# Patient Record
Sex: MALE | Race: BLACK OR AFRICAN AMERICAN | Employment: UNEMPLOYED | URBAN - NONMETROPOLITAN AREA
[De-identification: names, ages, dates, MRNs, and addresses within clinical notes are randomized per-mention and may not be internally consistent; named-entity substitution may affect disease eponyms.]

---
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
## 2021-02-07 ENCOUNTER — HOSPITAL ENCOUNTER (EMERGENCY)
Age: 21
Discharge: HOME | End: 2021-02-07
Attending: EMERGENCY MEDICINE
Payer: MEDICAID

## 2021-02-07 ENCOUNTER — APPOINTMENT (OUTPATIENT)
Dept: GENERAL RADIOLOGY | Age: 21
End: 2021-02-07
Attending: EMERGENCY MEDICINE
Payer: MEDICAID

## 2021-02-07 VITALS
RESPIRATION RATE: 17 BRPM | BODY MASS INDEX: 29.4 KG/M2 | DIASTOLIC BLOOD PRESSURE: 78 MMHG | HEART RATE: 66 BPM | WEIGHT: 210 LBS | OXYGEN SATURATION: 99 % | HEIGHT: 71 IN | SYSTOLIC BLOOD PRESSURE: 124 MMHG | TEMPERATURE: 98.4 F

## 2021-02-07 DIAGNOSIS — S16.1XXA STRAIN OF NECK MUSCLE, INITIAL ENCOUNTER: ICD-10-CM
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
## 2021-02-07 PROCEDURE — 74011250637 HC RX REV CODE- 250/637: Performed by: EMERGENCY MEDICINE

## 2021-02-07 PROCEDURE — 72050 X-RAY EXAM NECK SPINE 4/5VWS: CPT

## 2021-02-07 PROCEDURE — 99283 EMERGENCY DEPT VISIT LOW MDM: CPT

## 2021-02-07 RX ORDER — ACETAMINOPHEN 500 MG
500 TABLET ORAL
Status: COMPLETED | OUTPATIENT
Start: 2021-02-07 | End: 2021-02-07

## 2021-02-07 RX ADMIN — ACETAMINOPHEN 500 MG: 500 TABLET ORAL at 17:15
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
## 2021-03-02 ENCOUNTER — HOSPITAL ENCOUNTER (OUTPATIENT)
Dept: PHYSICAL THERAPY | Age: 21
Discharge: HOME | End: 2021-03-02
Payer: MEDICAID

## 2021-03-02 PROCEDURE — 97110 THERAPEUTIC EXERCISES: CPT

## 2021-03-02 PROCEDURE — 97161 PT EVAL LOW COMPLEX 20 MIN: CPT
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
## 2021-03-08 ENCOUNTER — HOSPITAL ENCOUNTER (OUTPATIENT)
Dept: PHYSICAL THERAPY | Age: 21
Discharge: HOME | End: 2021-03-08
Payer: MEDICAID

## 2021-03-08 PROCEDURE — 97110 THERAPEUTIC EXERCISES: CPT

## 2021-03-08 PROCEDURE — 97014 ELECTRIC STIMULATION THERAPY: CPT

## 2021-03-08 NOTE — PROGRESS NOTES
PT DAILY TREATMENT NOTE 2-15    Patient Name: Chip Schilling  Date:3/8/2021  : 2000  [x]  Patient  Verified  Payor: Andrew Ayala / Plan: [place] [employer] / Product Type: Managed Care Medicaid /    In time:1500  Out time:1605  Total Treatment Time (min): 65  Visit #:  2    Treatment Area: Neck pain [M54.2]  Lumbar spine strain [S39.012A]    SUBJECTIVE  Pain Level (0-10 scale): 5/10  Any medication changes, allergies to medications, adverse drug reactions, diagnosis change, or new procedure performed?: [x] No    [] Yes (see summary sheet for update)  Subjective functional status/changes:   [] No changes reported  \"I'm feeling a little better than I did the other day but I still have pain. \"    OBJECTIVE      Modality rationale: decrease pain and increase tissue extensibility to improve the patients ability to perform functional mobility without pain   Min Type Additional Details      15 [x] Estim: []Att   []Unatt    []TENS instruct                  []IFC  []Premod   []NMES                    []Other:  []w/US      [x]w/ heat  []w/ ice  Position:seated  Location: cervical and lumbar       []  Traction: [] Cervical       []Lumbar                       [] Prone          []Supine                       []Intermittent   []Continuous Lbs:  [] before manual  [] after manual  [] w/ heat  [] Simultaneously performed with w/ Estim    []  Ultrasound: []Continuous   [] Pulsed                       at: []1MHz   []3MHz Location:  W/cm2:    [] Paraffin         Location:   []w/heat    []  Ice     []  Heat  []  Ice massage Position:  Location:    []  Laser  []  Other: Position:  Location:      []  Vasopneumatic Device Pressure:       [] lo [] med [] hi   [] w/ ice      Temperature:   [] Simultaneously performed with w/ Estim     [x] Skin assessment post-treatment:  [x]intact [x]redness- no adverse reaction    []redness - adverse reaction:       50 min Therapeutic Exercise:  [] See flow sheet :   Rationale: increase ROM, increase strength and increase core stability to improve the patients ability to move and change body positions without discomfort    With   [x] TE   [] TA   [] neuro   [] other: Patient Education: [x] Review HEP    [] Progressed/Changed HEP based on:   [] positioning   [] body mechanics   [] transfers   [] heat/ice application    [] other:      Other Objective/Functional Measures: Pt tolerated treatment well but needed lots of cueing for technique, even with already issued HEP which suggests the need for greater reenforcement     Pain Level (0-10 scale) post treatment: 0/10    ASSESSMENT/Changes in Function:   Pt is progressing well and had no pain by then end of today's treatment session. Continue POC progressing as able. Patient will continue to benefit from skilled PT services to modify and progress therapeutic interventions, address functional mobility deficits, address ROM deficits, address strength deficits, analyze and address soft tissue restrictions and analyze and modify body mechanics/ergonomics to attain remaining goals. [x]  See Plan of Care  []  See progress note/recertification  []  See Discharge Summary         Progress towards goals / Updated goals: To be accomplished in 4 treatments:  1. Pt will be independent and compliant with HEP to facilitate functional mobility. Progressing  2. Pt will be able to demonstrate proper lifting technique to facilitate performance of ADLs. Progressing  3. Pt will be able to ambulate 0.5 miles with pain no greater than a 4/10 to facilitate community ambulation. Progressing     Long Term Goals: To be accomplished in 8 treatments:  1. Pt will be able to ambulate 1 mile with pain no greater than a 2/10 to facilitate community ambulation. Progressing  2. Pt will be able to lift 25# from floor to table with proper lifting technique with pain no greater than a 2/10 to facilitate performance of ADLs. Progressing  3.  Pt will be able to return to playing basketball with pain no greater than a 2/10 to facilitate return to PLOF.   Progressing     PLAN  [x]  Upgrade activities as tolerated     [x]  Continue plan of care  []  Update interventions per flow sheet       []  Discharge due to:_  []  Other:_      Jacinta Oneal, PT, DPT 3/8/2021

## 2021-03-09 ENCOUNTER — HOSPITAL ENCOUNTER (OUTPATIENT)
Dept: PHYSICAL THERAPY | Age: 21
End: 2021-03-09
Payer: MEDICAID

## 2021-03-11 ENCOUNTER — HOSPITAL ENCOUNTER (OUTPATIENT)
Dept: PHYSICAL THERAPY | Age: 21
Discharge: HOME | End: 2021-03-11
Payer: MEDICAID

## 2021-03-11 PROCEDURE — 97110 THERAPEUTIC EXERCISES: CPT

## 2021-03-11 PROCEDURE — 97014 ELECTRIC STIMULATION THERAPY: CPT

## 2021-03-11 NOTE — PROGRESS NOTES
PT DAILY TREATMENT NOTE 2-15    Patient Name: Kate Dandy  Date:3/11/2021  : 2000  [x]  Patient  Verified  Payor: Julia Cole / Plan: [email or web address] / Product Type: Managed Care Medicaid /    In time:9:02  Out time:1005  Total Treatment Time (min): 63  Visit #:  3    Treatment Area: Neck pain [M54.2]  Lumbar spine strain [S39.012A]    SUBJECTIVE  Pain Level (0-10 scale): 5/10  Any medication changes, allergies to medications, adverse drug reactions, diagnosis change, or new procedure performed?: [x] No    [] Yes (see summary sheet for update)  Subjective functional status/changes:   [] No changes reported  \"My neck was bothering me after last session. \"    OBJECTIVE      Modality rationale: decrease pain and increase tissue extensibility to improve the patients ability to perform ADLs without increased pain.    Min Type Additional Details      10 [x] Estim: []Att   [x]Unatt    []TENS instruct                  [x]IFC  []Premod   []NMES                    []Other:  []w/US      [x]w/ heat  []w/ ice  Position: Seated  Location: Low back / neck       []  Traction: [] Cervical       []Lumbar                       [] Prone          []Supine                       []Intermittent   []Continuous Lbs:  [] before manual  [] after manual  [] w/ heat  [] Simultaneously performed with w/ Estim    []  Ultrasound: []Continuous   [] Pulsed                       at: []1MHz   []3MHz Location:  W/cm2:    [] Paraffin         Location:   []w/heat   10 []  Ice     [x]  Heat  []  Ice massage Position: seated  Location: low back/neck    []  Laser  []  Other: Position:  Location:      []  Vasopneumatic Device Pressure:       [] lo [] med [] hi   [] w/ ice      Temperature:   [] Simultaneously performed with w/ Estim     [x] Skin assessment post-treatment:  [x]intact []redness- no adverse reaction    []redness - adverse reaction:       45 min Therapeutic Exercise:  [x] See flow sheet :   Rationale: increase ROM and increase strength to improve the patients ability to allow pt to participate in recreational activities without increased pain levels. With   [x] TE   [] TA   [] neuro   [] other: Patient Education: [x] Review HEP    [] Progressed/Changed HEP based on:   [] positioning   [] body mechanics   [] transfers   [] heat/ice application    [] other:      Other Objective/Functional Measures: Added in posterior shoulder rolls to further improve posture and mobility. Pain Level (0-10 scale) post treatment: 0/10    ASSESSMENT/Changes in Function:   Pt tolerated treatment well today. Pt able to perform exercises without increase in symptoms. Pt required verbal cues to maintain upright posture while performing upper trap and levator stretches. Pt noted decreased tightness in neck following stretches. Patient will continue to benefit from skilled PT services to modify and progress therapeutic interventions, address functional mobility deficits, analyze and address soft tissue restrictions, analyze and cue movement patterns and assess and modify postural abnormalities to attain remaining goals. [x]  See Plan of Care  []  See progress note/recertification  []  See Discharge Summary         Progress towards goals / Updated goals: To be accomplished in 4 treatments:  1. Pt will be independent and compliant with HEP to facilitate functional mobility. Progressing  2. Pt will be able to demonstrate proper lifting technique to facilitate performance of ADLs. Progressing  3. Pt will be able to ambulate 0.5 miles with pain no greater than a 4/10 to facilitate community ambulation. Progressing     Long Term Goals: To be accomplished in 8 treatments:  1. Pt will be able to ambulate 1 mile with pain no greater than a 2/10 to facilitate community ambulation.   Progressing  2. Pt will be able to lift 25# from floor to table with proper lifting technique with pain no greater than a 2/10 to facilitate performance of ADLs. Progressing  3. Pt will be able to return to playing basketball with pain no greater than a 2/10 to facilitate return to PLOF.   Progressing    PLAN  [x]  Upgrade activities as tolerated     [x]  Continue plan of care  []  Update interventions per flow sheet       []  Discharge due to:_  []  Other:_      Luis Fernando Jamil, PT, DPT 3/11/2021

## 2021-03-18 ENCOUNTER — HOSPITAL ENCOUNTER (OUTPATIENT)
Dept: PHYSICAL THERAPY | Age: 21
Discharge: HOME | End: 2021-03-18
Payer: MEDICAID

## 2021-03-18 PROCEDURE — 97014 ELECTRIC STIMULATION THERAPY: CPT

## 2021-03-18 PROCEDURE — 97110 THERAPEUTIC EXERCISES: CPT

## 2021-03-18 NOTE — PROGRESS NOTES
PT DAILY TREATMENT NOTE 2-15    Patient Name: Lewis Gay  Date:3/18/2021  : 2000  [x]  Patient  Verified  Payor: [employer] / Plan: [employer] / Product Type: Managed Care Medicaid /    In time: 8:04  Out time:8:57  Total Treatment Time (min): 53  Visit #: 4    Treatment Area: Neck pain [M54.2]  Lumbar spine strain [S39.012A]    SUBJECTIVE  Pain Level (0-10 scale): 0/10  Any medication changes, allergies to medications, adverse drug reactions, diagnosis change, or new procedure performed?: [x] No    [] Yes (see summary sheet for update)  Subjective functional status/changes:   [] No changes reported  Pt reports that his back bothers him on occasion and his neck is \"all better. \"     OBJECTIVE  Modality rationale: decrease pain and increase tissue extensibility to improve the patients ability to perform ADLs with reduced pain.    Min Type Additional Details      10 [x] Estim: []Att   [x]Unatt    []TENS instruct                  [x]IFC  []Premod   []NMES                    []Other:  []w/US      [x]w/ heat  []w/ ice  Position: Seated  Location: Over R lumbar region       []  Traction: [] Cervical       []Lumbar                       [] Prone          []Supine                       []Intermittent   []Continuous Lbs:  [] before manual  [] after manual  [] w/ heat  [] Simultaneously performed with w/ Estim    []  Ultrasound: []Continuous   [] Pulsed                       at: []1MHz   []3MHz Location:  W/cm2:    [] Paraffin         Location:   []w/heat   10 []  Ice     [x]  Heat  []  Ice massage Position: Seated  Location: Over R lumbar region    []  Laser  []  Other: Position:  Location:      []  Vasopneumatic Device Pressure:       [] lo [] med [] hi   [] w/ ice      Temperature:   [] Simultaneously performed with w/ Estim     [x] Skin assessment post-treatment:  [x]intact [x]redness- no adverse reaction    []redness - adverse reaction:     43 min Therapeutic Exercise:  [x] See flow sheet : Rationale: increase ROM and increase strength to improve the patients ability to perform ADLs with reduced pain. With   [x] TE   [] TA   [] neuro   [] other: Patient Education: [x] Review HEP    [] Progressed/Changed HEP based on:   [] positioning   [] body mechanics   [] transfers   [] heat/ice application    [] other:      Other Objective/Functional Measures: Increased repetitions with deadbugs and LTR. Pain Level (0-10 scale) post treatment: 0/10    ASSESSMENT/Changes in Function:   Pt tolerated therapy session well. Pt is steadily progressing with core stability and glute strengthening, but continues to be limited by occasional lumbar pain. Pt notes resolution of cervical pain and presence of lumbar pain that \"comes and goes. \" Emphasis of treatment session was on stretching, core stability, and glute strengthening. Pt requires verbal cueing for proper performance of QL stretch at wall for proper positioning. Continue to progress as able. Patient will continue to benefit from skilled PT services to modify and progress therapeutic interventions, address functional mobility deficits, address ROM deficits, address strength deficits, analyze and address soft tissue restrictions, analyze and cue movement patterns, analyze and modify body mechanics/ergonomics and assess and modify postural abnormalities to attain remaining goals. [x]  See Plan of Care  []  See progress note/recertification  []  See Discharge Summary         Progress towards goals / Updated goals: To be accomplished in 4 treatments:  1. Pt will be independent and compliant with HEP to facilitate functional mobility. -Met  2. Pt will be able to demonstrate proper lifting technique to facilitate performance of ADLs.   Progressing  3. Pt will be able to ambulate 0.5 miles with pain no greater than a 4/10 to facilitate community ambulation.  Progressing     Long Term Goals: To be accomplished in 8 treatments:  1.  Pt will be able to ambulate 1 mile with pain no greater than a 2/10 to facilitate community ambulation.   Progressing  2. Pt will be able to lift 25# from floor to table with proper lifting technique with pain no greater than a 2/10 to facilitate performance of ADLs.   Progressing  3. Pt will be able to return to playing basketball with pain no greater than a 2/10 to facilitate return to PLOF.  Progressing    PLAN  [x]  Upgrade activities as tolerated     [x]  Continue plan of care  []  Update interventions per flow sheet       []  Discharge due to:_  []  Other:_      Alexis Suggs, PT, DPT 3/18/2021

## 2021-03-23 ENCOUNTER — HOSPITAL ENCOUNTER (OUTPATIENT)
Dept: PHYSICAL THERAPY | Age: 21
Discharge: HOME | End: 2021-03-23
Payer: MEDICAID

## 2021-03-23 PROCEDURE — 97110 THERAPEUTIC EXERCISES: CPT

## 2021-03-23 PROCEDURE — 97014 ELECTRIC STIMULATION THERAPY: CPT

## 2021-03-23 NOTE — PROGRESS NOTES
PT DAILY TREATMENT NOTE 2-15    Patient Name: Jun All  Date:3/23/2021  : 2000  [x]  Patient  Verified  Payor: Terence Roland / Plan: [email or web address] / Product Type: Managed Care Medicaid /    In time:10:05 AM  Out time:11:00 AM  Total Treatment Time (min):55  Visit #:  5    Treatment Area: Neck pain [M54.2]  Lumbar spine strain [S39.012A]    SUBJECTIVE  Pain Level (0-10 scale): 0  Any medication changes, allergies to medications, adverse drug reactions, diagnosis change, or new procedure performed?: [x] No    [] Yes (see summary sheet for update)  Subjective functional status/changes:   [] No changes reported    Patient stated that he missed his last appointment because he had to go to work early at [email or web address]. Pain increases at work when he has to stand  as a  and reach and put groceries in the bags. OBJECTIVE    Modality rationale: decrease pain and increase muscle contraction/control to improve the patients ability to ambulate 1 mile with pain no greater than a 2/10 to facilitate community ambulation.    Min Type Additional Details      15 [] Estim: []Att   [x]Unatt    []TENS instruct                  []IFC  [x]Premod   []NMES                     []Other:  []w/US   []w/ice   [x]w/heat  Position: left side of Low back  Location: sitting position       []  Traction: [] Cervical       []Lumbar                       [] Prone          []Supine                       []Intermittent   []Continuous Lbs:  [] before manual  [] after manual  []w/heat    []  Ultrasound: []Continuous   [] Pulsed                       at: []1MHz   []3MHz Location:  W/cm2:    [] Paraffin         Location:   []w/heat    []  Ice     []  Heat  []  Ice massage Position:  Location:    []  Laser  []  Other: Position:  Location:      []  Vasopneumatic Device Pressure:       [] lo [] med [] hi   Temperature:      [x] Skin assessment post-treatment:  [x]intact []redness- no adverse reaction    []redness - adverse reaction: 40 min Therapeutic Exercise:  [x] See flow sheet :   Rationale: increase ROM and increase strength to improve the patients ability to playing basketball with pain no greater than a 2/10 to facilitate return to PLOF. With   [] TE   [] TA   [] Neuro   [] SC   [] other: Patient Education: [x] Review HEP    [] Progressed/Changed HEP based on:   [] positioning   [] body mechanics   [] transfers   [] heat/ice application    [] other:        Pain Level (0-10 scale) post treatment: 0    ASSESSMENT/Changes in Function:   Patient is able to perform all exercises as instructed. Continue to work on lumbar stabilization for improving long time standing positions particular at work. Proper liftng and carrying technique will also help to improve lumbar from overexertion. Patient will continue to benefit from skilled PT services to address functional mobility deficits, address ROM deficits, address strength deficits and analyze and cue movement patterns to attain remaining goals. [x]  See Plan of Care  []  See progress note/recertification  []  See Discharge Summary         Progress towards goals / Updated goals:  :  To be accomplished in 4 treatments:  1. Pt will be independent and compliant with HEP to facilitate functional mobility. -Met  2. Pt will be able to demonstrate proper lifting technique to facilitate performance of ADLs.   Progressing  3. Pt will be able to ambulate 0.5 miles with pain no greater than a 4/10 to facilitate community ambulation.  Progressing     Long Term Goals: To be accomplished in 8 treatments:  1. Pt will be able to ambulate 1 mile with pain no greater than a 2/10 to facilitate community ambulation.   Progressing  2. Pt will be able to lift 25# from floor to table with proper lifting technique with pain no greater than a 2/10 to facilitate performance of ADLs.   Progressing  3. Pt will be able to return to playing basketball with pain no greater than a 2/10 to facilitate return to OF.  Progressing    PLAN  []  Upgrade activities as tolerated     [x]  Continue plan of care  []  Update interventions per flow sheet       []  Discharge due to:_  []  Other:_      Anmol Carrion, PT 3/23/2021

## 2021-03-29 ENCOUNTER — HOSPITAL ENCOUNTER (OUTPATIENT)
Dept: PHYSICAL THERAPY | Age: 21
Discharge: HOME | End: 2021-03-29
Payer: MEDICAID

## 2021-03-29 PROCEDURE — 97110 THERAPEUTIC EXERCISES: CPT

## 2021-03-29 PROCEDURE — 97014 ELECTRIC STIMULATION THERAPY: CPT

## 2021-03-29 NOTE — PROGRESS NOTES
PT DAILY TREATMENT NOTE 2-15    Patient Name: Krista Toribio  Date:3/29/2021  : 2000  [x]  Patient  Verified  Payor: Reji Dyson / Plan: [id number] / Product Type: Managed Care Medicaid /    In time:10:02  Out time:10:56  Total Treatment Time (min): 47  Visit #: 6    Treatment Area: Neck pain [M54.2]  Lumbar spine strain [S39.012A]    SUBJECTIVE  Pain Level (0-10 scale): 0/10  Any medication changes, allergies to medications, adverse drug reactions, diagnosis change, or new procedure performed?: [x] No    [] Yes (see summary sheet for update)  Subjective functional status/changes:   [] No changes reported  Pt reports that he thinks his back is getting better, but he is still noticing some tightness and pain at work. OBJECTIVE    Modality rationale: decrease pain and increase tissue extensibility to improve the patients ability to perform work-related duties with reduced pain.    Min Type Additional Details      10 [x] Estim: []Att   [x]Unatt    []TENS instruct                  [x]IFC  []Premod   []NMES                    []Other:  []w/US      [x]w/ heat  []w/ ice  Position: Seated  Location: Over R lumbar region       []  Traction: [] Cervical       []Lumbar                       [] Prone          []Supine                       []Intermittent   []Continuous Lbs:  [] before manual  [] after manual  [] w/ heat  [] Simultaneously performed with w/ Estim    []  Ultrasound: []Continuous   [] Pulsed                       at: []1MHz   []3MHz Location:  W/cm2:    [] Paraffin         Location:   []w/heat   10 []  Ice     [x]  Heat  []  Ice massage Position: Seated  Location: Over lumbar region    []  Laser  []  Other: Position:  Location:      []  Vasopneumatic Device Pressure:       [] lo [] med [] hi   [] w/ ice      Temperature:   [] Simultaneously performed with w/ Estim     [x] Skin assessment post-treatment:  [x]intact [x]redness- no adverse reaction    []redness - adverse reaction:       44 min Therapeutic Exercise:  [x] See flow sheet :   Rationale: increase ROM and increase strength to improve the patients ability to perform work-related duties with reduced pain. With   [x] TE   [] TA   [] neuro   [] other: Patient Education: [x] Review HEP    [] Progressed/Changed HEP based on:   [] positioning   [] body mechanics   [] transfers   [] heat/ice application    [x] other: Pt educated on proper lifting technique for maintaining good positioning with lifting at work. Other Objective/Functional Measures: Added crate lifts, farmer's carry, and planks to facilitate further core stability training. Pain Level (0-10 scale) post treatment: 0/10    ASSESSMENT/Changes in Function:   Pt tolerated therapy session well. Pt is steadily progressing with reduction in myofasical pain and muscular tightness as well as enhancement of core stability, but continues to be limited with more advance core stability with functional tasks. Pt requires verbal cueing for proper performance of glute bridges and maintaining tension against theraband to promote hip abduction strengthening along with hip extension. Pt educated on proper lifting technique for maintaining proper lumbar alignment with lifting at work. Continue to progress as able. Patient will continue to benefit from skilled PT services to modify and progress therapeutic interventions, address functional mobility deficits, address ROM deficits, address strength deficits, analyze and address soft tissue restrictions, analyze and cue movement patterns and analyze and modify body mechanics/ergonomics to attain remaining goals. [x]  See Plan of Care  []  See progress note/recertification  []  See Discharge Summary         Progress towards goals / Updated goals:  Short Term Goals: To be accomplished in 4 treatments:  1. Pt will be independent and compliant with HEP to facilitate functional mobility. -Met  2.  Pt will be able to demonstrate proper lifting technique to facilitate performance of ADLs.  - Met  3. Pt will be able to ambulate 0.5 miles with pain no greater than a 4/10 to facilitate community ambulation.  Progressing     Long Term Goals: To be accomplished in 8 treatments:  1. Pt will be able to ambulate 1 mile with pain no greater than a 2/10 to facilitate community ambulation.   Progressing  2. Pt will be able to lift 25# from floor to table with proper lifting technique with pain no greater than a 2/10 to facilitate performance of ADLs.   Progressing  3. Pt will be able to return to playing basketball with pain no greater than a 2/10 to facilitate return to PLOF.  Progressing    PLAN  [x]  Upgrade activities as tolerated     [x]  Continue plan of care  []  Update interventions per flow sheet       []  Discharge due to:_  []  Other:_      Amarilis Chi, PT, DPT 3/29/2021

## 2021-03-30 ENCOUNTER — APPOINTMENT (OUTPATIENT)
Dept: PHYSICAL THERAPY | Age: 21
End: 2021-03-30
Payer: MEDICAID

## 2021-04-08 ENCOUNTER — HOSPITAL ENCOUNTER (OUTPATIENT)
Dept: PHYSICAL THERAPY | Age: 21
Discharge: HOME | End: 2021-04-08
Payer: MEDICAID

## 2021-04-08 PROCEDURE — 97110 THERAPEUTIC EXERCISES: CPT

## 2021-04-08 PROCEDURE — 97530 THERAPEUTIC ACTIVITIES: CPT

## 2021-04-08 NOTE — PROGRESS NOTES
PT DAILY TREATMENT NOTE 2-15    Patient Name: Alberto Grayson  Date:2021  : 2000  [x]  Patient  Verified  Payor: Taylerpa Sofia / Plan: [id number] / Product Type: Managed Care Medicaid /    In time:805 am  Out time:850 am  Total Treatment Time (min): 45  Visit #:  7    Treatment Area: Cervicalgia [M54.2]  Strain of muscle, fascia and tendon of lower back, initial encounter [S39.012A]    SUBJECTIVE  Pain Level (0-10 scale): 0/10  Any medication changes, allergies to medications, adverse drug reactions, diagnosis change, or new procedure performed?: [x] No    [] Yes (see summary sheet for update)  Subjective functional status/changes:   [] No changes reported  \"Pt reports no pain only some soreness after playing basketball for 4 hours the other day. \"    OBJECTIVE      30 min Therapeutic Exercise:  [x] See flow sheet :   Rationale: increase ROM, increase strength and improve coordination to improve the patients ability to increase core strength for functional activity and work duties. 15 min Therapeutic Activity:  []  See flow sheet :   Rationale: review and check of  increase ROM and increase strength  to improve the patients ability to perform community level task and return to work.         With   [] TE   [] TA   [] neuro   [] other: Patient Education: [x] Review HEP    [] Progressed/Changed HEP based on:   [] positioning   [] body mechanics   [] transfers   [] heat/ice application    [x] other: updated for continued use after therapy services     Cervical AROM:                       Flexion                                              30                                              Extension                                         70                                                                                          R                      L  Side Bending                           60                   60                      Rotation                                   60 60                     UPPER QUARTER                             MUSCLE STRENGTH  KEY                                                                                R                      L  0 - No Contraction                   C1, C2 Neck Flex        5/5 5/5  1 - Trace                                  C3 Side Flex               5/5 5/5  2 - Poor                                   C4 Sh Elev                  5/5 5/5  3 - Fair                                     C5 Deltoid/Biceps       5/5 5/5  4 - Good                                    5 - Normal                                                                                                           Neurological: Reflexes / Sensations: LT sensation intact in B UEs.   Special Tests: Cervical Distraction: Negative                       Cervical Compression: Negative                                                                                                    Lumbar AROM:                                               Flexion                                              100%                                            Extension                                         100%                                                                                            R                                   L  Side Bending                           100%                           100%                                         Rotation                                   100% p!                       100%                    Manual Muscle Testing  Hip Flexion                   5/5 5/5  Hip Abduction              5/5 5/5  Hip Adduction              5/5 5/5  Knee Extension           5/5 5/5  Knee Flexion               5/5 5/5  Ankle PF                     5/5 5/5  Ankle DF                     5/5 5/5     Flexibility: Bilateral HS and gastroc tightness                                                Special Tests: Slump: Negative        Pain Level (0-10 scale) post treatment: 0/10    ASSESSMENT/Changes in Function:   Patient tolerated treatment session with review of core strengthening ex. Check of goals and mm strength and ROM measure. Discussion with DPT on currently levels and plan for discharge at this time. Patient will continue to benefit from skilled PT services to no continued need going forward. goals met. []  See Plan of Care  []  See progress note/recertification  [x]  See Discharge Summary         Progress towards goals / Updated goals:  Short Term Goals: To be accomplished in 4 treatments:  1. Pt will be independent and compliant with HEP to facilitate functional mobility. met  2. Pt will be able to demonstrate proper lifting technique to facilitate performance of ADLs. met  3. Pt will be able to ambulate 0.5 miles with pain no greater than a 4/10 to facilitate community ambulation. met     Long Term Goals: To be accomplished in 8 treatments:  1. Pt will be able to ambulate 1 mile with pain no greater than a 2/10 to facilitate community ambulation. met  2. Pt will be able to lift 25# from floor to table with proper lifting technique with pain no greater than a 2/10 to facilitate performance of ADLs. met  3. Pt will be able to return to playing basketball with pain no greater than a 2/10 to facilitate return to PLOF. Partial met (post 4 hours)       PLAN  []  Upgrade activities as tolerated     []  Continue plan of care  []  Update interventions per flow sheet       [x]  Discharge due to: goals met and return to work next week   [x]  Other:_see DPT note      Teania L. Jaycee Bumpers, Alfred Sample 4/8/2021

## 2021-04-08 NOTE — PROGRESS NOTES
[address], [address]  Ph: [phone]    Fax: [phone]    Discharge Summary  2-15    Patient name: Vincenzo Shelley  : 2000  Provider#: [id number]  Referral source: Natha Goodell, PA-C      Medical/Treatment Diagnosis: Cervicalgia [M54.2]  Strain of muscle, fascia and tendon of lower back, initial encounter [U12.857E]     Prior Hospitalization: see medical history     Comorbidities: See Plan of Care  Prior Level of Function:See Plan of Care  Medications: Verified on Patient Summary List    Start of Care: 3/2/2021      Onset Date: 2021   Visits from Start of Care: 7 Missed Visits: 5  Reporting Period : 3/2/2021 to 2021    ASSESSMENT/SUMMARY OF CARE:   Patient has attended 7 physical therapy visits for treatment of low back and neck strain. Pt has received interventions including therapeutic exercise, core/LE strengthening, cervical and lumbar ROM exercises, and therapeutic modalities. Pt has demonstrated improvements in neck and LE strength, ROM, and pain levels. Pt has met all set goals and has verbalized that he has noticed improvements in overall functional mobility noted by ability to participate in recreational activities again. Patient to be discharged at this time from skilled physical therapy due to improvements in overall mobility and for meeting all goals. Thank you for this referral.         Progress towards goals / Updated goals:  Short Term Goals: To be accomplished in 4 treatments:  1. Pt will be independent and compliant with HEP to facilitate functional mobility. met  2. Pt will be able to demonstrate proper lifting technique to facilitate performance of ADLs. met  3. Pt will be able to ambulate 0.5 miles with pain no greater than a 4/10 to facilitate community ambulation. met     Long Term Goals: To be accomplished in 8 treatments:  1.  Pt will be able to ambulate 1 mile with pain no greater than a 2/10 to facilitate community ambulation.  met  2. Pt will be able to lift 25# from floor to table with proper lifting technique with pain no greater than a 2/10 to facilitate performance of ADLs. met  3. Pt will be able to return to playing basketball with pain no greater than a 2/10 to facilitate return to PLOF.  Partial met (post 4 hours)        RECOMMENDATIONS:  [x]Discontinue therapy: [x]Patient has reached or is progressing toward set goals      []Patient is non-compliant or has abdicated      []Due to lack of appreciable progress towards set goals      []Other    Eladio Muro, PT, DPT 4/8/2021

## 2023-02-01 RX ORDER — IBUPROFEN 600 MG/1
600 TABLET ORAL EVERY 6 HOURS PRN
COMMUNITY
Start: 2022-02-09